# Patient Record
Sex: MALE | Race: WHITE | NOT HISPANIC OR LATINO | Employment: PART TIME | URBAN - METROPOLITAN AREA
[De-identification: names, ages, dates, MRNs, and addresses within clinical notes are randomized per-mention and may not be internally consistent; named-entity substitution may affect disease eponyms.]

---

## 2024-01-10 NOTE — PROGRESS NOTES
01/12/2023  18-year-old gentleman presents scrotal lesion for 2 months, the lesion no itching no pain no discharge, appears smaller over 2 months.  Voiding well no burning no blood in the urine    Exam: Circumcised normal penis; 1 x 1 cm pink raised smooth lesion on the right side scrotal wall no discharge no tenderness    We discussed scrotal skin lesion  We discussed STD unlikely  We discussed conservative observation  All the questions were answered, the patient expressed understanding and agreed to the plan.    Impression  Scrotal skin lesion    Plan  Conservative management  3 months follow-up    New Patient   Chief Complaint   Patient presents with    Scrotal Lesion     Patient claims he has received HPV vaccine. Patient describes first noticing a scrotal lesion 2-3 months ago. He claims that the lesion has attenuated in size, however, there is formation of a new cyst within the past 2 weeks. Patient denies scrotal pain.         Physical Exam     TODAYS LAB RESULTS:    No scrotal US     POC Glucose, Urine  NEGATIVE mg/dl NEGATIVE   POC Bilirubin, Urine  NEGATIVE NEGATIVE   POC Ketones, Urine  NEGATIVE mg/dl NEGATIVE   POC Specific Gravity, Urine  1.005 - 1.035 1.020   POC Blood, Urine  NEGATIVE TRACE-Lysed Abnormal    POC PH, Urine  No Reference Range Established PH 6.5   POC Protein, Urine  NEGATIVE, 30 (1+) mg/dl NEGATIVE   POC Urobilinogen, Urine  0.2, 1.0 EU/DL 0.2   Poc Nitrite, Urine  NEGATIVE NEGATIVE   POC Leukocytes, Urine  NEGATIVE NEGATIVE     ASSESSMENT&PLAN:      IMPRESSIONS:

## 2024-01-12 ENCOUNTER — OFFICE VISIT (OUTPATIENT)
Dept: UROLOGY | Facility: CLINIC | Age: 19
End: 2024-01-12
Payer: COMMERCIAL

## 2024-01-12 DIAGNOSIS — N50.9 SCROTAL LESION: ICD-10-CM

## 2024-01-12 LAB
POC BILIRUBIN, URINE: NEGATIVE
POC BLOOD, URINE: ABNORMAL
POC GLUCOSE, URINE: NEGATIVE MG/DL
POC KETONES, URINE: NEGATIVE MG/DL
POC LEUKOCYTES, URINE: NEGATIVE
POC NITRITE,URINE: NEGATIVE
POC PH, URINE: 6.5 PH
POC PROTEIN, URINE: NEGATIVE MG/DL
POC SPECIFIC GRAVITY, URINE: 1.02
POC UROBILINOGEN, URINE: 0.2 EU/DL

## 2024-01-12 PROCEDURE — 99233 SBSQ HOSP IP/OBS HIGH 50: CPT | Performed by: UROLOGY

## 2024-01-12 PROCEDURE — 81003 URINALYSIS AUTO W/O SCOPE: CPT | Performed by: UROLOGY

## 2024-01-30 ENCOUNTER — APPOINTMENT (OUTPATIENT)
Dept: UROLOGY | Facility: CLINIC | Age: 19
End: 2024-01-30
Payer: COMMERCIAL

## 2024-03-08 ENCOUNTER — APPOINTMENT (OUTPATIENT)
Dept: UROLOGY | Facility: CLINIC | Age: 19
End: 2024-03-08
Payer: COMMERCIAL

## 2024-03-15 ENCOUNTER — APPOINTMENT (OUTPATIENT)
Dept: UROLOGY | Facility: CLINIC | Age: 19
End: 2024-03-15
Payer: COMMERCIAL

## 2025-02-06 ENCOUNTER — HOSPITAL ENCOUNTER (EMERGENCY)
Facility: HOSPITAL | Age: 20
Discharge: HOME | End: 2025-02-07
Attending: STUDENT IN AN ORGANIZED HEALTH CARE EDUCATION/TRAINING PROGRAM
Payer: COMMERCIAL

## 2025-02-06 ENCOUNTER — APPOINTMENT (OUTPATIENT)
Dept: RADIOLOGY | Facility: HOSPITAL | Age: 20
End: 2025-02-06
Payer: COMMERCIAL

## 2025-02-06 VITALS
SYSTOLIC BLOOD PRESSURE: 165 MMHG | HEART RATE: 95 BPM | OXYGEN SATURATION: 99 % | DIASTOLIC BLOOD PRESSURE: 97 MMHG | RESPIRATION RATE: 16 BRPM | TEMPERATURE: 98.6 F

## 2025-02-06 DIAGNOSIS — S89.92XA INJURY OF LEFT KNEE, INITIAL ENCOUNTER: Primary | ICD-10-CM

## 2025-02-06 DIAGNOSIS — S89.92XA INJURY OF LIGAMENT OF LEFT KNEE, INITIAL ENCOUNTER: ICD-10-CM

## 2025-02-06 PROCEDURE — 99283 EMERGENCY DEPT VISIT LOW MDM: CPT

## 2025-02-06 PROCEDURE — 73564 X-RAY EXAM KNEE 4 OR MORE: CPT | Mod: LT

## 2025-02-06 ASSESSMENT — COLUMBIA-SUICIDE SEVERITY RATING SCALE - C-SSRS
6. HAVE YOU EVER DONE ANYTHING, STARTED TO DO ANYTHING, OR PREPARED TO DO ANYTHING TO END YOUR LIFE?: NO
1. IN THE PAST MONTH, HAVE YOU WISHED YOU WERE DEAD OR WISHED YOU COULD GO TO SLEEP AND NOT WAKE UP?: NO
2. HAVE YOU ACTUALLY HAD ANY THOUGHTS OF KILLING YOURSELF?: NO

## 2025-02-06 ASSESSMENT — PAIN SCALES - GENERAL: PAINLEVEL_OUTOF10: 2

## 2025-02-06 ASSESSMENT — PAIN DESCRIPTION - PAIN TYPE: TYPE: ACUTE PAIN

## 2025-02-06 ASSESSMENT — PAIN - FUNCTIONAL ASSESSMENT: PAIN_FUNCTIONAL_ASSESSMENT: 0-10

## 2025-02-07 PROCEDURE — 73564 X-RAY EXAM KNEE 4 OR MORE: CPT | Mod: LEFT SIDE | Performed by: STUDENT IN AN ORGANIZED HEALTH CARE EDUCATION/TRAINING PROGRAM

## 2025-02-07 NOTE — ED PROVIDER NOTES
"HPI   Chief Complaint   Patient presents with    Knee Injury     Was playing basketball and jumped up and came down and heard a \"pop\" pt think he hurt a ligament        19-year-old male presents ED with left knee pain.  He says he had jumped up in basketball and landed and felt a pop in his left knee.  The knee itself did not hit the floor.  No other injury.  No numbness.  Tingling to the left foot.  Pain mostly to the lateral aspect of the knee.              Patient History   No past medical history on file.  No past surgical history on file.  No family history on file.  Social History     Tobacco Use    Smoking status: Never    Smokeless tobacco: Never   Vaping Use    Vaping status: Never Used   Substance Use Topics    Alcohol use: Yes    Drug use: Never       Physical Exam   ED Triage Vitals [02/06/25 2241]   Temperature Heart Rate Respirations BP   37 °C (98.6 °F) 95 16 (!) 165/97      Pulse Ox Temp Source Heart Rate Source Patient Position   99 % Temporal -- --      BP Location FiO2 (%)     -- --       Physical Exam  Vitals and nursing note reviewed.   Constitutional:       General: He is not in acute distress.     Appearance: He is well-developed.   HENT:      Head: Normocephalic and atraumatic.   Eyes:      Conjunctiva/sclera: Conjunctivae normal.   Cardiovascular:      Rate and Rhythm: Normal rate and regular rhythm.      Heart sounds: No murmur heard.  Pulmonary:      Effort: Pulmonary effort is normal. No respiratory distress.      Breath sounds: Normal breath sounds.   Abdominal:      Palpations: Abdomen is soft.      Tenderness: There is no abdominal tenderness.   Musculoskeletal:         General: No swelling.      Cervical back: Neck supple.      Comments: Tenderness of the left side of the knee.  There is some associated swelling.  No significant looseness to the knee.  The quad and patellar tendons are grossly intact.  Normal DP and PT pulse left foot.  Sensation intact throughout the lower extremity. "   Skin:     General: Skin is warm and dry.      Capillary Refill: Capillary refill takes less than 2 seconds.   Neurological:      Mental Status: He is alert.   Psychiatric:         Mood and Affect: Mood normal.           ED Course & MDM   Diagnoses as of 02/07/25 0052   Injury of left knee, initial encounter   Injury of ligament of left knee, initial encounter                 No data recorded     [place] Coma Scale Score: 15 (02/06/25 2244 : Milla Cheng RN)                           Medical Decision Making  HISTORIAN:  Patient    CHART REVIEW:  No pertinent findings    PT SUMMARY:  19-year-old male presents ED with left knee pain after injuring it at basketball.  Vital signs stable.    DDX:  Sprain, fracture, ligament rupture, tendon rupture    PLAN:  Obtain x-ray of the left knee.    DISPO/RE-EVAL:  X-ray shows an avulsion fracture of the left tibial plateau.  Consistent with likely ligamentous injury.  Otherwise no other significant findings on x-ray.  Patient was placed in an Ace wrap for stability.  Given crutches.  Will discharge patient home with referral to orthopedics for likely ligamentous injury.  Recommend coming back to the ED for any new or worsening symptoms.          Procedure  Procedures     Swapnil Wei DO  02/07/25 0053

## 2025-02-11 ENCOUNTER — APPOINTMENT (OUTPATIENT)
Dept: ORTHOPEDIC SURGERY | Facility: CLINIC | Age: 20
End: 2025-02-11
Payer: COMMERCIAL

## 2025-03-27 ENCOUNTER — OFFICE VISIT (OUTPATIENT)
Dept: PRIMARY CARE | Facility: CLINIC | Age: 20
End: 2025-03-27
Payer: COMMERCIAL

## 2025-03-27 VITALS
SYSTOLIC BLOOD PRESSURE: 118 MMHG | HEART RATE: 72 BPM | WEIGHT: 172 LBS | DIASTOLIC BLOOD PRESSURE: 75 MMHG | HEIGHT: 70 IN | RESPIRATION RATE: 16 BRPM | BODY MASS INDEX: 24.62 KG/M2

## 2025-03-27 DIAGNOSIS — S01.81XS LACERATION OF FOREHEAD, SEQUELA: ICD-10-CM

## 2025-03-27 DIAGNOSIS — Z01.818 PREOP EXAMINATION: Primary | ICD-10-CM

## 2025-03-27 DIAGNOSIS — S02.2XXS CLOSED FRACTURE OF NASAL BONE, SEQUELA: ICD-10-CM

## 2025-03-27 PROCEDURE — G2211 COMPLEX E/M VISIT ADD ON: HCPCS | Performed by: FAMILY MEDICINE

## 2025-03-27 PROCEDURE — 3008F BODY MASS INDEX DOCD: CPT | Performed by: FAMILY MEDICINE

## 2025-03-27 PROCEDURE — 93000 ELECTROCARDIOGRAM COMPLETE: CPT | Performed by: FAMILY MEDICINE

## 2025-03-27 PROCEDURE — 99204 OFFICE O/P NEW MOD 45 MIN: CPT | Performed by: FAMILY MEDICINE

## 2025-03-27 NOTE — PROGRESS NOTES
"Subjective   Patient ID: Uli Farmer is a 19 y.o. male who presents for Establish Care (STITCH REMOVAL ON FOREHEAD ) and Pre-op Exam (LEFT ACL RECONSTRUCTION WITH QT AUTOGRAFT WITH DR PAYTON FONTENOT ON 4/8/25 [facility] ).  HPI  HX OF ACL R KNEE   NO COMPLICATIONS WITH PREVIOUS SURGERY   NO DIABETES , NO HEART DISEASE   NO STORKES   NO SMOKING   NO DRUGS   ALCOHOL , ON WEEKEND BEER MANLY     NO CHEST PAIN , NO SOB   GOES TOT HE GYM     HE DOES SOME CARDIO , HE USES THE STIAR MASTER ABOUT 15 MINUTES , HIGEHR INTENSITY . NO CHEST PAIN , NO sob   INJURY DUE TO BASKETBALL INJURY   FOOT BALL IN HIGH SCHOOL , HE HAD THE R LEG INJURY     HAD [place] DIVING INJ[place]Y IN [place] , HAD SUTURES TO HIS LEFT HEAD , WENT TO THE ed HERE , HEAD SCAN NO BLEEDING , FACIAL SCAN SHOWED BONE FRACTURE.     PATIENT IS STUDYING [employer] AND [employer] AT [employer].           Review of Systems    History reviewed. No pertinent past medical history.    Past Surgical History:   Procedure Laterality Date    KNEE ARTHROSCOPY W/ ACL RECONSTRUCTION Right 11/2021      Social History     Socioeconomic History    Marital status: Single   Tobacco Use    Smoking status: Never    Smokeless tobacco: Never   Vaping Use    Vaping status: Never Used   Substance and Sexual Activity    Alcohol use: Yes     Alcohol/week: 15.0 standard drinks of alcohol     Types: 15 Cans of beer per week    Drug use: Never    Sexual activity: Yes     Partners: Female     Birth control/protection: Condom Male, Other     Comment: Pill      Family History   Problem Relation Name Age of Onset    No Known Problems Mother      No Known Problems Father         MEDICATIONS AND ALLERGIES:    ALLERGIES Patient has no known allergies.    MEDICATIONS   No current outpatient medications on file prior to visit.     No current facility-administered medications on file prior to visit.              Objective   Visit Vitals  /75   Pulse 72   Resp 16   Ht 1.778 m (5' 10\")   Wt 78 kg " "(172 lb)   BMI 24.68 kg/m²   Smoking Status Never   BSA 1.96 m²          12/6/2023    12:54 PM 2/6/2025    10:41 PM 3/27/2025     8:36 AM   Vitals   Systolic 142 165 118   Diastolic 77 97 75   Heart Rate 61 95 72   Temp 36.9 °C (98.4 °F) 37 °C (98.6 °F)    Resp 16 16 16   Height 1.778 m (5' 10\")  1.778 m (5' 10\")   Weight (lb) 166.01  172   BMI 23.82 kg/m2  24.68 kg/m2   BSA (m2) 1.93 m2  1.96 m2   Visit Report   Report     Physical Exam  Constitutional:       Appearance: Normal appearance. He is normal weight.   HENT:      Head: Normocephalic.      Right Ear: Tympanic membrane normal.      Left Ear: Tympanic membrane normal.      Nose: Nose normal.      Mouth/Throat:      Pharynx: Oropharynx is clear.   Eyes:      Pupils: Pupils are equal, round, and reactive to light.   Cardiovascular:      Rate and Rhythm: Normal rate and regular rhythm.      Pulses: Normal pulses.      Heart sounds: Normal heart sounds.   Pulmonary:      Effort: Pulmonary effort is normal.      Breath sounds: Normal breath sounds. No stridor. No rhonchi.   Musculoskeletal:         General: No swelling or tenderness.   Skin:     Coloration: Skin is not jaundiced or pale.   Neurological:      General: No focal deficit present.      Mental Status: He is alert and oriented to person, place, and time. Mental status is at baseline.      Cranial Nerves: No cranial nerve deficit.      Sensory: No sensory deficit.      Motor: No weakness.      Coordination: Coordination normal.      Gait: Gait normal.      Deep Tendon Reflexes: Reflexes normal.   Psychiatric:         Mood and Affect: Mood normal.         Behavior: Behavior normal.         Thought Content: Thought content normal.         Judgment: Judgment normal.       Forehead laceration healed well       Assessment & Plan  Preop examination    EKG done today showed normal sinus rhythm, no signs of arrhythmias, overall assessment suggest that the patient's risk of periprocedural cardiac complication " should be low, I would proceed with the proposed procedure no further cardiac workup is indicated, patient understands the risk involved and he is currently cleared for the procedure.  Pending the labs requested today  EKG with short OK interval , advised close intraoperative monitoring.       Orders:    CBC and Auto Differential; Future    Comprehensive metabolic panel; Future    Hemoglobin A1C; Future    ECG 12 lead (Clinic Performed)    Laceration of forehead, sequela  [place] , healed well , no signs of infection   Sutures removed, well tolerated          Closed fracture of nasal bone, sequela  After a dive in the DR   Advised to follow up with ENT if not getting better   Patient is agreeable.

## 2025-03-28 LAB
ALBUMIN SERPL-MCNC: 5.1 G/DL (ref 3.6–5.1)
ALP SERPL-CCNC: 82 U/L (ref 36–130)
ALT SERPL-CCNC: 18 U/L (ref 9–46)
ANION GAP SERPL CALCULATED.4IONS-SCNC: 7 MMOL/L (CALC) (ref 7–17)
AST SERPL-CCNC: 17 U/L (ref 10–40)
BASOPHILS # BLD AUTO: 53 CELLS/UL (ref 0–200)
BASOPHILS NFR BLD AUTO: 1.1 %
BILIRUB SERPL-MCNC: 1.1 MG/DL (ref 0.2–1.2)
BUN SERPL-MCNC: 20 MG/DL (ref 7–25)
CALCIUM SERPL-MCNC: 10 MG/DL (ref 8.6–10.3)
CHLORIDE SERPL-SCNC: 103 MMOL/L (ref 98–110)
CO2 SERPL-SCNC: 29 MMOL/L (ref 20–32)
CREAT SERPL-MCNC: 0.91 MG/DL (ref 0.6–1.24)
EGFRCR SERPLBLD CKD-EPI 2021: 123 ML/MIN/1.73M2
EOSINOPHIL # BLD AUTO: 211 CELLS/UL (ref 15–500)
EOSINOPHIL NFR BLD AUTO: 4.4 %
ERYTHROCYTE [DISTWIDTH] IN BLOOD BY AUTOMATED COUNT: 12.1 % (ref 11–15)
EST. AVERAGE GLUCOSE BLD GHB EST-MCNC: 105 MG/DL
EST. AVERAGE GLUCOSE BLD GHB EST-SCNC: 5.8 MMOL/L
GLUCOSE SERPL-MCNC: 92 MG/DL (ref 65–99)
HBA1C MFR BLD: 5.3 % OF TOTAL HGB
HCT VFR BLD AUTO: 47.4 % (ref 38.5–50)
HGB BLD-MCNC: 15.5 G/DL (ref 13.2–17.1)
LYMPHOCYTES # BLD AUTO: 1565 CELLS/UL (ref 850–3900)
LYMPHOCYTES NFR BLD AUTO: 32.6 %
MCH RBC QN AUTO: 28.1 PG (ref 27–33)
MCHC RBC AUTO-ENTMCNC: 32.7 G/DL (ref 32–36)
MCV RBC AUTO: 86 FL (ref 80–100)
MONOCYTES # BLD AUTO: 446 CELLS/UL (ref 200–950)
MONOCYTES NFR BLD AUTO: 9.3 %
NEUTROPHILS # BLD AUTO: 2525 CELLS/UL (ref 1500–7800)
NEUTROPHILS NFR BLD AUTO: 52.6 %
PLATELET # BLD AUTO: 293 THOUSAND/UL (ref 140–400)
PMV BLD REES-ECKER: 9.3 FL (ref 7.5–12.5)
POTASSIUM SERPL-SCNC: 4.2 MMOL/L (ref 3.5–5.3)
PROT SERPL-MCNC: 7.6 G/DL (ref 6.1–8.1)
RBC # BLD AUTO: 5.51 MILLION/UL (ref 4.2–5.8)
SODIUM SERPL-SCNC: 139 MMOL/L (ref 135–146)
WBC # BLD AUTO: 4.8 THOUSAND/UL (ref 3.8–10.8)